# Patient Record
Sex: FEMALE | Race: ASIAN | URBAN - METROPOLITAN AREA
[De-identification: names, ages, dates, MRNs, and addresses within clinical notes are randomized per-mention and may not be internally consistent; named-entity substitution may affect disease eponyms.]

---

## 2019-05-29 ENCOUNTER — HISTORICAL (OUTPATIENT)
Dept: ADMINISTRATIVE | Facility: HOSPITAL | Age: 39
End: 2019-05-29

## 2019-05-29 LAB
BUN SERPL-MCNC: 14 MG/DL (ref 7–18)
CREAT SERPL-MCNC: 0.6 MG/DL (ref 0.6–1.3)

## 2019-06-12 ENCOUNTER — HISTORICAL (OUTPATIENT)
Dept: RADIOLOGY | Facility: HOSPITAL | Age: 39
End: 2019-06-12

## 2022-04-10 ENCOUNTER — HISTORICAL (OUTPATIENT)
Dept: ADMINISTRATIVE | Facility: HOSPITAL | Age: 42
End: 2022-04-10

## 2022-04-30 VITALS
SYSTOLIC BLOOD PRESSURE: 111 MMHG | WEIGHT: 118.19 LBS | DIASTOLIC BLOOD PRESSURE: 72 MMHG | BODY MASS INDEX: 22.31 KG/M2 | HEIGHT: 61 IN

## 2022-05-02 NOTE — HISTORICAL [facility] CERNER
This is a historical note converted from [employer]. Formatting and pictures may have been removed.  Please reference Joanna for original formatting and attached multimedia. Chief Complaint  Referred for thyroid nodule  History of Present Illness  5/29/19: 38yo [place] female that presents for evaluation of neck mass and thyroid nodule. She states taht shes noted a right sided lump for the past couple of years and it has been getting bigger. It is tender at times but not all the time. It does not swell and get smaller, but is consistently bigger. Denies fevers, weight loss, lumps/bumps in armpit or groin, dysphagia, dysphonia, chills, myalgia. Denies history of TB, hepatitis or HIV. Denies recent animal scratches or travel outside the country. Does not note any lumps on the left neck.  ?  She had a tumor removed from her right neck in 2007 in [place]. She does not know what it was, but it was?not a cancer. She has a sister that had thyroid cancer that had surgery for it recently.  ?  She had a thyroid ultrasound at [facility] which showed a 2.6mm hypoechoic right thyroid nodule and a right neck LN 2.1cm.  ?  Denies tobacco, alcohol, drugs.  Review of Systems  Constitutional:?no fever, fatigue, weakness  Eye:?no vision loss, eye redness, drainage, or pain  ENMT:?As above  Respiratory:?no cough, no wheezing, no shortness of breath  Cardiovascular:?no chest pain, no palpitations, no edema  Gastrointestinal:?no nausea, vomiting, or diarrhea. No abdominal pain  Genitourinary:?no dysuria, no urinary frequency or urgency, no hematuria  Hema/Lymph:?no abnormal bruising or bleeding  Endocrine:?no heat or cold intolerance, no excessive thirst or excessive urination  Musculoskeletal:?no muscle or joint pain, no joint swelling  Integumentary:?no skin rash or abnormal lesion  Neurologic: no headache, no dizziness, no weakness or numbness  ?  Physical Exam  Vitals & Measurements  T:?36.9? ?C (Oral)? HR:?60(Peripheral)?  RR:?16? BP:?109/74?  HT:?154.94?cm? WT:?53.5?kg? BMI:?22.29?  NAD, AAOx3  PERRLA, EOMI  NC, AT  B external ears WNL. B TM intact without effusion or retraction.  External nose WNL, septum midline, mild IT hypertrophy  No trismus, tonsils symmetric 1+, FoM/BoT soft, no oral lesions  Neck soft, R level II palpable firm fullness deep to upper SCM, nontender, ~2-3cm  Larynx mobile  Strong voice  Non-labored respirations, no stridor  Abd soft, ND  CN II-XII intact  Assessment/Plan  1.?Neck mass?R22.1  ?  ?  39yoF with R neck mass x years. Has not been on Abx. 2.1cm on recent US.  - Augmentin BID x?7 days  - CT neck with contrast in 2 weeks  - RTC 4 weeks with results. May need FNA  ?  Lia Ruffin MD  [facility]   Problem List/Past Medical History  Ongoing  No qualifying data  Historical  No qualifying data  Procedure/Surgical History  Thyroidectomy   Medications  No active medications  Allergies  No Known Medication Allergies  Social History  Alcohol  Never, 05/29/2019  Employment/School  Employed, 05/29/2019  Exercise  Exercise frequency: 3-4 times/week. Exercise type: Walking, Running., 05/29/2019  Home/Environment  Lives with Children. Alcohol abuse in household: Yes. Substance abuse in household: Yes. Smoker in household: No. Feels unsafe at home: No. Safe place to go: Yes., 05/29/2019  Nutrition/Health  Regular, 05/29/2019  Sexual  Sexually active: No. Sexual orientation: Straight or heterosexual. Gender Identity Identifies as female., 05/29/2019  Substance Abuse  Never, 05/29/2019  Tobacco  Never (less than 100 in lifetime), N/A, 05/29/2019  Family History  Hypertension.: Mother.  Health Maintenance  Health Maintenance  ???Pending?(in the next year)  ??? ??OverDue  ??? ? ? ?Alcohol Misuse Screening due??01/01/19??and every 1??year(s)  ??? ??Due?  ??? ? ? ?ADL Screening due??05/29/19??and every 1??year(s)  ??? ? ? ?Tetanus Vaccine due??05/29/19??and every 10??year(s)  ??? ??Due In Future?  ??? ? ? ?Obesity  Screening not due until??01/01/20??and every 1??year(s)  ???Satisfied?(in the past 1 year)  ??? ??Satisfied?  ??? ? ? ?Blood Pressure Screening on??05/29/19.??Satisfied by Leonor Cardona  ??? ? ? ?Body Mass Index Check on??05/29/19.??Satisfied by Leonor Cardona  ??? ? ? ?Depression Screening on??05/29/19.??Satisfied by Leonor Cardona  ??? ? ? ?Obesity Screening on??05/29/19.??Satisfied by Leonor Cardona  ?  ?      I reviewed the history, exam, assessment, and plan in the resident?s note?and agree. ?I actively participated in this patient?s care and helped to formulate the plan of care.  ?

## 2024-04-12 DIAGNOSIS — Z12.31 ENCOUNTER FOR SCREENING MAMMOGRAM FOR MALIGNANT NEOPLASM OF BREAST: ICD-10-CM

## 2024-04-12 DIAGNOSIS — N94.6 MENSTRUAL PAIN: ICD-10-CM

## 2024-04-17 ENCOUNTER — HOSPITAL ENCOUNTER (OUTPATIENT)
Dept: RADIOLOGY | Facility: HOSPITAL | Age: 44
Discharge: HOME | End: 2024-04-17
Attending: NURSE PRACTITIONER

## 2024-04-17 DIAGNOSIS — Z12.31 ENCOUNTER FOR SCREENING MAMMOGRAM FOR MALIGNANT NEOPLASM OF BREAST: ICD-10-CM

## 2024-04-17 PROCEDURE — 77063 BREAST TOMOSYNTHESIS BI: CPT | Mod: 26,,, | Performed by: RADIOLOGY

## 2024-04-17 PROCEDURE — 77067 SCR MAMMO BI INCL CAD: CPT | Mod: TC

## 2024-04-17 PROCEDURE — 77067 SCR MAMMO BI INCL CAD: CPT | Mod: 26,,, | Performed by: RADIOLOGY

## 2024-05-02 ENCOUNTER — HOSPITAL ENCOUNTER (OUTPATIENT)
Dept: RADIOLOGY | Facility: HOSPITAL | Age: 44
Discharge: HOME | End: 2024-05-02
Attending: NURSE PRACTITIONER

## 2024-05-02 DIAGNOSIS — N94.6 MENSTRUAL PAIN: ICD-10-CM

## 2024-05-02 PROCEDURE — 76856 US EXAM PELVIC COMPLETE: CPT | Mod: TC
